# Patient Record
(demographics unavailable — no encounter records)

---

## 2025-06-07 NOTE — CONSULT LETTER
[FreeTextEntry2] : Jg Soriano MD [address] [place] [address] Phone: [phone] [FreeTextEntry3] : Krunal Oliveira MD Division of Pediatric, General, Thoracic and Endoscopic Surgery [facility]

## 2025-06-07 NOTE — PHYSICAL EXAM
[FreeTextEntry4] : mid anterior chest wall sinus tract with mild surrounding induration and hyperremia, no drainage, no fluctuance, no cellulitis,

## 2025-06-07 NOTE — ADDENDUM
[FreeTextEntry1] : Documented by Apolinar Padilla acting as a scribe for Dr. Oliveira on 06/05/2025.   All medical record entries made by the Scribe were at my, Dr. Oliveira, direction and personally dictated by me on 06/05/2025. I have reviewed the chart and agree that the record accurately reflects my personal performances of the history, physical exam, assessment and plan. I have also personally directed, reviewed, and agree with the instructions.

## 2025-06-07 NOTE — CONSULT LETTER
[FreeTextEntry2] : Jg Soriano MD [address] [place] [address] Phone: [phone] [FreeTextEntry3] : Krunal Oliveira MD Division of Pediatric, General, Thoracic and Endoscopic Surgery [facility]

## 2025-06-07 NOTE — HISTORY OF PRESENT ILLNESS
[FreeTextEntry1] : Wilton is an 11 year old boy who presents today after treatment of a chest wall abscess.  Dad says since he was a young boy they noticed an open sinus tract of his mid chest.  Dad is not sure if he had any drainage at the site.  Wilton did not have any pain at the site and was asymptomatic until last week when he developed an abscess at the site.  He developed redness and swelling.  He presented to an urgent care where the abscess was drained and he was placed on antibiotics.  Since then, dad says his symptoms have improved.  He has completed his antibiotics.  He no longer has any pain or swelling.

## 2025-06-07 NOTE — ASSESSMENT
[FreeTextEntry1] : Wilton is an 11 year old male presenting today with a recently infected branchial sinus tract.  The infection is now improving, and he has no evidence of any undrained collection or untreated infection.  I educated dad about this diagnosis and offered reassurance.  I discussed the treatment options at this time including ongoing observation versus surgical excision.  I discussed that without excision he may continue to have recurrent infections at the site.   I reviewed the indications, risks, benefits and alternatives of resection.  The risks discussed included but were not limited to bleeding, infection, injury to adjacent structures, recurrence, wound healing difficulties, etc. Dad understands the possible increased risk of recurrence after the area has been infected.  I reviewed postoperative expectations and instructions.  I reviewed optimal timing to proceed to optimize healing and recovery and given the recent infection I recommended waiting another 4-6 weeks at least until the site is completely healed.  Dad demonstrates understanding.  I have recommended they return to see me in 2-3 weeks for another wound check.  Should the site show improvement, we will schedule the procedure at that time.  Lashonda knows to contact me sooner with any further questions or concerns.

## 2025-06-28 NOTE — PHYSICAL EXAM
[NL] : grossly intact [FreeTextEntry4] : mid anterior chest wall sinus tract with minimal fibrinous exudate, ~2-3mm, no longer with any surrounding induration and hyperremia, no drainage, no granulation tissue

## 2025-06-28 NOTE — HISTORY OF PRESENT ILLNESS
[FreeTextEntry1] : Wilton is an 11 year old boy here today to follow up for his previously infected branchial sinus tract. He has been doing well since our initial consult.  Mom continues to notice a wound at the site but thinks it is improving.  He is no longer having drainage at the site.  He does not complain of any pain at the site. He has not had any fevers.

## 2025-06-28 NOTE — REASON FOR VISIT
[Follow-up - Scheduled] : a follow-up, scheduled visit for [Other: ____] : [unfilled] [Patient] : patient [Mother] : mother [FreeTextEntry4] : Dr Jg Soriano

## 2025-06-28 NOTE — CONSULT LETTER
[Dear  ___] : Dear  [unfilled], [Consult Letter:] : I had the pleasure of evaluating your patient, [unfilled]. [Please see my note below.] : Please see my note below. [Consult Closing:] : Thank you very much for allowing me to participate in the care of this patient.  If you have any questions, please do not hesitate to contact me. [Sincerely,] : Sincerely, [FreeTextEntry2] : Jg Soriano [place] [address] [place] [address] Phone: [phone] [FreeTextEntry3] : Krunal Oliveira MD Division of Pediatric, General, Thoracic and Endoscopic Surgery [facility]

## 2025-06-28 NOTE — ADDENDUM
[FreeTextEntry1] : Documented by Apolinar Padilla acting as a scribe for Dr. Oliveira on 06/25/2025.   All medical record entries made by the Scribe were at my, Dr. Oliveira, direction and personally dictated by me on 06/25/2025. I have reviewed the chart and agree that the record accurately reflects my personal performances of the history, physical exam, assessment and plan. I have also personally directed, reviewed, and agree with the instructions. attest

## 2025-06-28 NOTE — CONSULT LETTER
[Dear  ___] : Dear  [unfilled], [Consult Letter:] : I had the pleasure of evaluating your patient, [unfilled]. [Please see my note below.] : Please see my note below. [Consult Closing:] : Thank you very much for allowing me to participate in the care of this patient.  If you have any questions, please do not hesitate to contact me. [Sincerely,] : Sincerely, [FreeTextEntry2] : Jg Soriano MD [address] [place] [address] Phone: [phone] [FreeTextEntry3] : Krunal Oliveira MD Division of Pediatric, General, Thoracic and Endoscopic Surgery [facility]

## 2025-06-28 NOTE — ASSESSMENT
[FreeTextEntry1] : Wilton is an 11 year old male with a recently infected branchial sinus. He continues to do well, and I reassured mom that the site appears to be healing nicely with some persistent residual inflammation but no longer appears infected. I educated mom about branchial anomalies.  I discussed the treatment options including ongoing observation versus excision of the sinus tract.  Mom understands without resection Wilton may develop ongoing recurrent infections at the site.  I reviewed the procedure in detail and mom is interested in proceeding.  I have recommended we wait until the site is completely healed to optimize a complete resection and minimize the risk of recurrence.  Mom is in agreement with this plan.  I have recommended they return to see me in 2-3 weeks for another wound check.  They know to contact me sooner with any further questions or concerns or should Wilton develop any new or recurrent signs/symptoms.

## 2025-07-20 NOTE — ASSESSMENT
[FreeTextEntry1] : Wilton is an 11 year old boy with a recently infected branchial sinus. On exam today, the area has developed protruding granulation tissue, but does not appear to have any evidence of infection or drainage.  I reviewed treatment options with mom and while I do feel that surgical intervention may eventually be warranted to prevent ongoing symptoms, the outcomes would likely be improved when the site is improved and without granulation tissue.  Mom is eager to hold off on any surgical intervention.  Given this, I recommended cauterizing the granulation tissue with silver nitrate which mom was agreeable to.  The granulation tissue was cauterized and Wilton tolerated this well.  I reviewed with mom that this may require repeated treatments and while addressing the current appearance of the site, it is possible the branchial anomaly remains and can lead to future recurrent symptoms.  We agree to closely monitor the site.  Mom understands that even if the granulation tissue resolves, the underlying lesion may persist.  I have recommended she send me photographs of the site in the upcoming days.  His follow up will depend upon the how the site is healing and any symptoms he may have.  Mom knows to contact me at any time with any further questions or concerns.

## 2025-07-20 NOTE — HISTORY OF PRESENT ILLNESS
[FreeTextEntry1] : Wilton is an 11 year old boy here today to follow up for his previously infected branchial sinus tract. Since his last visit, he has been doing well.  Mom says the area now has pink tissue with intermittent drainage.  Wilton has not developed any pain at the site.  He has not had any fevers.

## 2025-07-20 NOTE — ADDENDUM
[FreeTextEntry1] : Documented by Apolinar Padilla acting as a scribe for Dr. Oliveira on 07/16/2025.   All medical record entries made by the Scribe were at my, Dr. Oliveira, direction and personally dictated by me on 07/16/2025. I have reviewed the chart and agree that the record accurately reflects my personal performances of the history, physical exam, assessment and plan. I have also personally directed, reviewed, and agree with the instructions.

## 2025-07-20 NOTE — PHYSICAL EXAM
[NL] : grossly intact [FreeTextEntry4] : mid anterior chest wall sinus tract, now with ~4-5mm of protruding pink granulation tissue, no active drainage, no surrounding erythema

## 2025-07-20 NOTE — CONSULT LETTER
[Dear  ___] : Dear  [unfilled], [Consult Letter:] : I had the pleasure of evaluating your patient, [unfilled]. [Please see my note below.] : Please see my note below. [Consult Closing:] : Thank you very much for allowing me to participate in the care of this patient.  If you have any questions, please do not hesitate to contact me. [Sincerely,] : Sincerely, [FreeTextEntry2] : Jg Soriano MD [address] [place] [address] Phone: [phone] [FreeTextEntry3] : Krunal Oliveira MD Division of Pediatric, General, Thoracic and Endoscopic Surgery [facility]

## 2025-07-20 NOTE — CONSULT LETTER
[Dear  ___] : Dear  [unfilled], [Consult Letter:] : I had the pleasure of evaluating your patient, [unfilled]. [Please see my note below.] : Please see my note below. [Consult Closing:] : Thank you very much for allowing me to participate in the care of this patient.  If you have any questions, please do not hesitate to contact me. [Sincerely,] : Sincerely, [FreeTextEntry2] : Jg Soriano MD [address] [place] [address] Phone: [phone] [FreeTextEntry3] : Krunal Oliveira MD Division of Pediatric, General, Thoracic and Endoscopic Surgery [facility]

## 2025-07-20 NOTE — CONSULT LETTER
[Dear  ___] : Dear  [unfilled], [Consult Letter:] : I had the pleasure of evaluating your patient, [unfilled]. [Please see my note below.] : Please see my note below. [Consult Closing:] : Thank you very much for allowing me to participate in the care of this patient.  If you have any questions, please do not hesitate to contact me. [Sincerely,] : Sincerely, [FreeTextEntry2] : Jg Soriano MD [address] [place] [address] Phone: [phone] [FreeTextEntry3] : Krunal Oliveira MD Division of Pediatric, General, Thoracic and Endoscopic Surgery [facility]

## 2025-07-25 NOTE — HISTORY OF PRESENT ILLNESS
[FreeTextEntry1] : NPV - growths on face  [de-identified] : 10yo M, accompanied by father, presenting for growths on the face, present for 3-4 years.  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

## 2025-07-25 NOTE — HISTORY OF PRESENT ILLNESS
[FreeTextEntry1] : NPV - growths on face  [de-identified] : 12yo M, accompanied by father, presenting for growths on the face, present for 3-4 years.  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

## 2025-07-25 NOTE — ASSESSMENT
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, face Chronic -Reassured on benign nature of this condition -Recommended waiting for cosmetic removal until after puberty if interested,   #Brachial Cleft Sinus, chest Chronic -Continue f/u with pediatric surgeon -Healing within normal limits, no evidence of infection  #Preauricular pit, L ear Chronic -Counseling and reassurance provided, continue to monitor -Discussed association w/ renal anomalies, father reports previously evaluated for this  #Benign nevus, L neck Chronic  - Photoprotection reviewed including sun-protective behaviors, protective clothing, and the use of OTC broad-spectrum SPF 30+ sunscreens was advised   - RTC if develops lesions that are new, symptomatic (bleeding/itching), changing in size/color/shape        RTC for cosmetic removal PRN  Allie Elaine MD  [facility] Dermatology Resident, PGY-2

## 2025-07-25 NOTE — ASSESSMENT
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, face Chronic -Reassured on benign nature of this condition -Recommended waiting for cosmetic removal until after puberty if interested,   #Brachial Cleft Sinus, chest Chronic -Continue f/u with pediatric surgeon -Healing within normal limits, no evidence of infection  #Preauricular pit, L ear Chronic -Counseling and reassurance provided, continue to monitor -Discussed association w/ renal anomalies, father reports previously evaluated for this  #Benign nevus, L neck Chronic  - Photoprotection reviewed including sun-protective behaviors, protective clothing, and the use of OTC broad-spectrum SPF 30+ sunscreens was advised   - RTC if develops lesions that are new, symptomatic (bleeding/itching), changing in size/color/shape        RTC for cosmetic removal PRN  Allie Elaine MD  [facility] Dermatology Resident, PGY-2

## 2025-07-25 NOTE — PHYSICAL EXAM
[FreeTextEntry3] : Focused exam of face and chest -Few 1mm brown papules at right temple, one on left temple -Central chest with erosion  -L helix with auricular pit  -Brown uniform macule on the L superior neck